# Patient Record
Sex: MALE | NOT HISPANIC OR LATINO
[De-identification: names, ages, dates, MRNs, and addresses within clinical notes are randomized per-mention and may not be internally consistent; named-entity substitution may affect disease eponyms.]

---
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
## 2018-11-23 ENCOUNTER — IMAGING SERVICES (OUTPATIENT)
Dept: OTHER | Age: 18
End: 2018-11-23

## 2018-11-27 VITALS
TEMPERATURE: 96.8 F | WEIGHT: 155 LBS | SYSTOLIC BLOOD PRESSURE: 108 MMHG | DIASTOLIC BLOOD PRESSURE: 60 MMHG | HEART RATE: 75 BPM

## 2021-07-14 ENCOUNTER — HOSPITAL ENCOUNTER (EMERGENCY)
Facility: HOSPITAL | Age: 21
Discharge: HOME | End: 2021-07-14
Attending: EMERGENCY MEDICINE
Payer: MEDICAID

## 2021-07-14 VITALS
WEIGHT: 160 LBS | SYSTOLIC BLOOD PRESSURE: 121 MMHG | HEART RATE: 100 BPM | OXYGEN SATURATION: 99 % | BODY MASS INDEX: 21.2 KG/M2 | DIASTOLIC BLOOD PRESSURE: 73 MMHG | TEMPERATURE: 99 F | RESPIRATION RATE: 18 BRPM | HEIGHT: 73 IN

## 2021-07-14 DIAGNOSIS — S39.012A LOW BACK STRAIN, INITIAL ENCOUNTER: Primary | ICD-10-CM

## 2021-07-14 PROCEDURE — 99282 EMERGENCY DEPT VISIT SF MDM: CPT

## 2021-07-14 RX ORDER — IBUPROFEN 600 MG/1
600 TABLET ORAL ONCE
Status: COMPLETED | OUTPATIENT
Start: 2021-07-14 | End: 2021-07-14

## 2021-07-14 RX ORDER — ACETAMINOPHEN 500 MG
1000 TABLET ORAL ONCE
Status: COMPLETED | OUTPATIENT
Start: 2021-07-14 | End: 2021-07-14

## 2021-07-14 NOTE — ED PROVIDER NOTES
Patient Seen in: [facility] Emergency Department      History   Patient presents with:  Eval-P  Back Pain    Stated Complaint: Low back pain, Neck pain, Denies injury.  +Nausea    HPI/Subjective:   HPI    49-year-old male who presents to the ER compl motions are intact. No scleral icterus or conjunctival pallor: Neck is supple without tenderness on palpation. Head is atraumatic normocephalic. Lungs: Clear to auscultation bilaterally. No wheezes, rhonchi, or rales appreciated.   No accessory muscle

## 2021-07-14 NOTE — ED INITIAL ASSESSMENT (HPI)
Upon further assessment, PT said yes to SI in past 30 days. PT aware crisis assessment will take place.

## 2021-07-14 NOTE — ED INITIAL ASSESSMENT (HPI)
Pt to ed from home with c/o nausea and lower back pain that started at 2am this morning, + stiff neck and headache   \"I felt hit this morning but couldn't take my temperature\"

## 2025-10-03 ENCOUNTER — APPOINTMENT (OUTPATIENT)
Dept: GASTROENTEROLOGY | Age: 25
End: 2025-10-03